# Patient Record
Sex: MALE | Race: WHITE | Employment: FULL TIME | URBAN - METROPOLITAN AREA
[De-identification: names, ages, dates, MRNs, and addresses within clinical notes are randomized per-mention and may not be internally consistent; named-entity substitution may affect disease eponyms.]

---

## 2020-12-17 ENCOUNTER — TRANSCRIBE ORDER (OUTPATIENT)
Dept: SCHEDULING | Age: 50
End: 2020-12-17

## 2020-12-17 ENCOUNTER — HOSPITAL ENCOUNTER (OUTPATIENT)
Dept: VASCULAR SURGERY | Age: 50
Discharge: HOME | End: 2020-12-17
Attending: ORTHOPAEDIC SURGERY
Payer: OTHER GOVERNMENT

## 2020-12-17 ENCOUNTER — HOSPITAL ENCOUNTER (EMERGENCY)
Age: 50
Discharge: HOME | End: 2020-12-17
Attending: EMERGENCY MEDICINE
Payer: OTHER GOVERNMENT

## 2020-12-17 VITALS
SYSTOLIC BLOOD PRESSURE: 141 MMHG | HEART RATE: 82 BPM | DIASTOLIC BLOOD PRESSURE: 92 MMHG | RESPIRATION RATE: 16 BRPM | OXYGEN SATURATION: 96 % | TEMPERATURE: 97.4 F

## 2020-12-17 DIAGNOSIS — I82.412 THROMBOSIS OF LEFT FEMORAL VEIN (HCC): ICD-10-CM

## 2020-12-17 DIAGNOSIS — I82.412 THROMBOSIS OF LEFT FEMORAL VEIN (HCC): Primary | ICD-10-CM

## 2020-12-17 DIAGNOSIS — I82.412 ACUTE DEEP VEIN THROMBOSIS (DVT) OF FEMORAL VEIN OF LEFT LOWER EXTREMITY (HCC): Primary | ICD-10-CM

## 2020-12-17 LAB
ALBUMIN SERPL-MCNC: 3.8 G/DL (ref 3.5–5)
ALBUMIN/GLOB SERPL: 1.1 {RATIO} (ref 1.1–2.2)
ALP SERPL-CCNC: 75 U/L (ref 45–117)
ALT SERPL-CCNC: 50 U/L (ref 12–78)
ANION GAP SERPL CALC-SCNC: 5 MMOL/L (ref 5–15)
AST SERPL-CCNC: 38 U/L (ref 15–37)
BILIRUB SERPL-MCNC: 0.5 MG/DL (ref 0.2–1)
BUN SERPL-MCNC: 15 MG/DL (ref 6–20)
BUN/CREAT SERPL: 12 (ref 12–20)
CALCIUM SERPL-MCNC: 9.2 MG/DL (ref 8.5–10.1)
CHLORIDE SERPL-SCNC: 104 MMOL/L (ref 97–108)
CO2 SERPL-SCNC: 28 MMOL/L (ref 21–32)
CREAT SERPL-MCNC: 1.22 MG/DL (ref 0.7–1.3)
GLOBULIN SER CALC-MCNC: 3.5 G/DL (ref 2–4)
GLUCOSE SERPL-MCNC: 100 MG/DL (ref 65–100)
POTASSIUM SERPL-SCNC: 4 MMOL/L (ref 3.5–5.1)
PROT SERPL-MCNC: 7.3 G/DL (ref 6.4–8.2)
SODIUM SERPL-SCNC: 137 MMOL/L (ref 136–145)

## 2020-12-17 PROCEDURE — 80053 COMPREHEN METABOLIC PANEL: CPT

## 2020-12-17 PROCEDURE — 36415 COLL VENOUS BLD VENIPUNCTURE: CPT

## 2020-12-17 PROCEDURE — 99282 EMERGENCY DEPT VISIT SF MDM: CPT

## 2020-12-17 PROCEDURE — 93971 EXTREMITY STUDY: CPT

## 2020-12-17 RX ORDER — APIXABAN 5 MG (74)
KIT ORAL
Qty: 1 DOSE PACK | Refills: 0 | Status: SHIPPED | OUTPATIENT
Start: 2020-12-17

## 2020-12-17 NOTE — ED PROVIDER NOTES
Patient is a 44year old male who presents to ED c/o left leg swelling and pain first appreciated in October. Patient reports he initially attributed the pain to a knee injury and made an appt with Dr. Maggie Lerner in order to have it evaluated. After being seen by Dr. Maggie Lerner today, he was sent for a duplex ultrasound to rule out DVT. Duplex revealed a left femoral vein thrombosis. Patient denies any chest pain, shortness of breath, difficulty breathing, fever, chills or any other symptoms at this time. Patient denies any prior history of DVT. Past Medical History: Depression             Past Medical History:   Diagnosis Date    DVT (deep venous thrombosis) ([facility] [address]        History reviewed. No pertinent surgical history. History reviewed. No pertinent family history.     Social History     Socioeconomic History    Marital status:      Spouse name: Not on file    Number of children: Not on file    Years of education: Not on file    Highest education level: Not on file   Occupational History    Not on file   Social Needs    Financial resource strain: Not on file    Food insecurity     Worry: Not on file     Inability: Not on file    Transportation needs     Medical: Not on file     Non-medical: Not on file   Tobacco Use    Smoking status: Not on file   Substance and Sexual Activity    Alcohol use: Not on file    Drug use: Not on file    Sexual activity: Not on file   Lifestyle    Physical activity     Days per week: Not on file     Minutes per session: Not on file    Stress: Not on file   Relationships    Social connections     Talks on phone: Not on file     Gets together: Not on file     Attends Jainism service: Not on file     Active member of club or organization: Not on file     Attends meetings of clubs or organizations: Not on file     Relationship status: Not on file    Intimate partner violence     Fear of current or ex partner: Not on file     Emotionally abused: Not on file Physically abused: Not on file     Forced sexual activity: Not on file   Other Topics Concern    Not on file   Social History Narrative    Not on file         ALLERGIES: Patient has no known allergies. Review of Systems   Constitutional: Negative for chills and fever. Respiratory: Negative for cough, chest tightness, shortness of breath and wheezing. Cardiovascular: Negative for chest pain and palpitations. Musculoskeletal: Positive for arthralgias and myalgias. Negative for back pain, joint swelling, neck pain and neck stiffness. Positive for left leg pain and swelling   Skin: Negative for color change, rash and wound. Allergic/Immunologic: Negative for immunocompromised state. Neurological: Negative for weakness, light-headedness, numbness and headaches. Hematological: Does not bruise/bleed easily. All other systems reviewed and are negative. Vitals:    12/17/20 1517   BP: (!) 141/92   Pulse: 82   Resp: 16   Temp: 97.4 °F (36.3 °C)   SpO2: 96%            Physical Exam  Vitals signs and nursing note reviewed. Constitutional:       Appearance: Normal appearance. He is well-developed. HENT:      Head: Normocephalic and atraumatic. Nose: Nose normal.      Mouth/Throat:      Mouth: Mucous membranes are moist.   Eyes:      General: Lids are normal.      Extraocular Movements: Extraocular movements intact. Conjunctiva/sclera: Conjunctivae normal.   Neck:      Musculoskeletal: Normal range of motion and neck supple. Cardiovascular:      Rate and Rhythm: Normal rate and regular rhythm. Pulses: Normal pulses. Heart sounds: Normal heart sounds, S1 normal and S2 normal.      Comments: Swelling noted to entire left lower extremity. Pulmonary:      Effort: Pulmonary effort is normal. No accessory muscle usage. Abdominal:      General: Abdomen is flat. Palpations: Abdomen is soft. Musculoskeletal: Normal range of motion.       Comments: Swelling to entire left leg noted with slight tenderness throughout. Neurovascularly intact distally. Ambulates without difficulty. Skin:     General: Skin is warm and dry. Capillary Refill: Capillary refill takes less than 2 seconds. Neurological:      General: No focal deficit present. Mental Status: He is alert and oriented to person, place, and time. Mental status is at baseline. Psychiatric:         Attention and Perception: Attention normal.         Mood and Affect: Mood and affect normal.         Speech: Speech normal.         Behavior: Behavior normal. Behavior is cooperative. Thought Content: Thought content normal.         Cognition and Memory: Cognition normal.         Judgment: Judgment normal.          MDM  Number of Diagnoses or Management Options  Acute deep vein thrombosis (DVT) of femoral vein of left lower extremity (HCC)  Diagnosis management comments: Patient is a 68-year-old male with history described above. Duplex ultrasound performed earlier reveals a left femoral vein thrombosis. Will order metabolic panel to evaluate kidney function and will start patient on a 30-day starter pack of Eliquis. Patient will plan to follow-up with primary care physician for evaluation and refill of Eliquis prescription. Return to ER precautions given to patient. Patient understands and agrees with plan. All questions addressed and answered to patient satisfaction. Amount and/or Complexity of Data Reviewed  Clinical lab tests: ordered and reviewed  Discuss the patient with other providers: yes (Dr. Niesha Juan, ED Attending )      ED Course as of Dec 17 1728   Thu Dec 17, 5987   6075 METABOLIC PANEL, COMPREHENSIVE(!):    Sodium 137   Potassium 4.0   Chloride 104   CO2 28   Anion gap 5   Glucose 100   BUN 15   Creatinine 1.22   BUN/Creatinine ratio 12   GFR est AA >60   GFR est non-AA >60   Calcium 9.2   Bilirubin, total 0.5   ALT 50   AST 38(!)   Alk.  phosphatase 75   Protein, total 7.3   Albumin 3.8   Globulin 3.5   A-G Ratio 1.1 [KM]      ED Course User Index  [KM] AMIE Delaney       Procedures

## 2020-12-17 NOTE — DISCHARGE INSTRUCTIONS
Patient Education     Patient was given information on Eliquis and given a prescription for a 30 day starter pack. Advised patient to follow-up with PCP in 1 month for re-eval and for refill of Eliquis prescription. Apixaban (By mouth)   Apixaban (a-PIX-a-ban)  Treats and prevents blood clots. This medicine is a blood thinner. Brand Name(s): Eliquis   There may be other brand names for this medicine. When This Medicine Should Not Be Used: This medicine is not right for everyone. Do not use it if you had an allergic reaction to apixaban or you have active bleeding. How to Use This Medicine:   Tablet  · Your doctor will tell you how much medicine to use. Do not use more than directed. · If you are not able to swallow the tablets whole, they may be crushed and mixed in water, 5% dextrose in water (D5W), apple juice, or applesauce. The crushed tablets may be mixed with 60 mL of water or D5W dose and given through a nasogastric tube (NGT). · This medicine should come with a Medication Guide. Ask your pharmacist for a copy if you do not have one. · Missed dose: Take a dose as soon as you remember. If it is almost time for your next dose, wait until then and take a regular dose. Do not take extra medicine to make up for a missed dose. · Store the medicine in a closed container at room temperature, away from heat, moisture, and direct light. Drugs and Foods to Avoid:   Ask your doctor or pharmacist before using any other medicine, including over-the-counter medicines, vitamins, and herbal products. · Some medicines can affect how apixaban works.  Tell your doctor if you are using any of the following:   ¨ Carbamazepine, clarithromycin, itraconazole, ketoconazole, phenytoin, rifampin, ritonavir, Ayana's wort  ¨ Blood thinner (including clopidogrel, heparin, prasugrel, warfarin)  ¨ Medicine to treat depression  ¨ NSAID pain or arthritis medicine (including aspirin, celecoxib, diclofenac, ibuprofen, naproxen)  Warnings While Using This Medicine:   · Tell your doctor if you are pregnant or breastfeeding, or if you have kidney disease, liver disease, bleeding problems, or an artificial heart valve. · Do not stop using this medicine suddenly without asking your doctor. You might have a higher risk of stroke for a short time after you stop using this medicine. · This medicine increases your risk for bleeding that can become serious if not controlled. You may also bruise easily, and it may take longer than usual for bleeding to stop. · This medicine may increase your risk for blood clots in your spine or back if you undergo an epidural or spinal puncture. This could lead to paralysis. Tell your doctor if you ever had spine problems or back surgery. · Tell any doctor or dentist who treats you that you are using this medicine. With your doctor's supervision, you may need to stop using this medicine several days before you have surgery or medical tests. · Your doctor will do lab tests at regular visits to check on the effects of this medicine. Keep all appointments. · Keep all medicine out of the reach of children. Never share your medicine with anyone. Possible Side Effects While Using This Medicine:   Call your doctor right away if you notice any of these side effects:  · Allergic reaction: Itching or hives, swelling in your face or hands, swelling or tingling in your mouth or throat, chest tightness, trouble breathing  · Change in how much or how often you urinate, red or pink urine  · Chest pain, trouble breathing  · Coughing up blood, vomiting blood or material that looks like coffee grounds  · Numbness, tingling, or muscle weakness in your legs or feet  · Red or black, tarry stools  · Unusual bleeding, bruising, or weakness  If you notice other side effects that you think are caused by this medicine, tell your doctor. Call your doctor for medical advice about side effects.  You may report side effects to FDA at 1-800-FDA-1088  © 2017 [facility] Information is for End User's use only and may not be sold, redistributed or otherwise used for commercial purposes. The above information is an  only. It is not intended as medical advice for individual conditions or treatments. Talk to your doctor, nurse or pharmacist before following any medical regimen to see if it is safe and effective for you. Deep Vein Thrombosis: Care Instructions  Overview     A deep vein thrombosis (DVT) is a blood clot in certain veins, usually in the legs, pelvis, or arms. Blood clots in these veins need to be treated because they can get bigger, break loose, and travel through the bloodstream to the lungs. A blood clot in a lung can be life-threatening. The doctor may have given you a blood thinner (anticoagulant). A blood thinner can stop the blood clot from growing larger and prevent new clots from forming. You will need to take a blood thinner for 3 to 6 months or longer. The doctor has checked you carefully, but problems can develop later. If you notice any problems or new symptoms, get medical treatment right away. Follow-up care is a key part of your treatment and safety. Be sure to make and go to all appointments, and call your doctor if you are having problems. It's also a good idea to know your test results and keep a list of the medicines you take. How can you care for yourself at home? · Take your medicines exactly as prescribed. Call your doctor if you think you are having a problem with your medicine. · If you are taking a blood thinner, be sure you get instructions about how to take your medicine safely. Blood thinners can cause serious bleeding problems. · Wear compression stockings if your doctor recommends them. These stockings are tighter at the feet than on the legs. They may reduce pain and swelling in your legs.  But there are different types of stockings, and they need to fit right. So your doctor will recommend what you need. · When you sit, use a pillow to raise the arm or leg that has the blood clot. Try to keep it above the level of your heart. When should you call for help? Call 911 anytime you think you may need emergency care. For example, call if:    · You passed out (lost consciousness).     · You have symptoms of a blood clot in your lung (called a pulmonary embolism). These include:  ? Sudden chest pain. ? Trouble breathing. ? Coughing up blood. Call your doctor now or seek immediate medical care if:    · You have new or worse trouble breathing.     · You are dizzy or lightheaded, or you feel like you may faint.     · You have symptoms of a blood clot in your arm or leg. These may include:  ? Pain in the arm, calf, back of the knee, thigh, or groin. ? Redness and swelling in the arm, leg, or groin. Watch closely for changes in your health, and be sure to contact your doctor if:    · You do not get better as expected. Where can you learn more? Go to http://www.gray.com/  Enter W445 in the search box to learn more about \"Deep Vein Thrombosis: Care Instructions. \"  Current as of: March 4, 2020               Content Version: 12.6  © 3728-8152 [employer]. Care instructions adapted under license by [employer] (which disclaims liability or warranty for this information). If you have questions about a medical condition or this instruction, always ask your healthcare professional. [place] [address] any warranty or liability for your use of this information.

## 2020-12-17 NOTE — ED TRIAGE NOTES
Pt sent here for thrombosis of left femoral vein, denies cp or sob, pt c/o swelling and pain since beginning of Oct , had a doppler this am

## 2020-12-17 NOTE — ED NOTES
Patient left prior to receiving discharge instructions or having IV removed by staff. Dr. Marsha Daugherty.

## 2020-12-17 NOTE — ED NOTES
Patient called by Dr. Vinny Browne. Patient reports he received discharge instructions from PA and IV was removed.

## 2021-01-13 ENCOUNTER — TRANSCRIBE ORDER (OUTPATIENT)
Dept: SCHEDULING | Age: 51
End: 2021-01-13

## 2021-01-13 DIAGNOSIS — I82.412 DVT FEMORAL (DEEP VENOUS THROMBOSIS) WITH THROMBOPHLEBITIS, LEFT (HCC): Primary | ICD-10-CM

## 2021-05-24 ENCOUNTER — TRANSCRIBE ORDER (OUTPATIENT)
Dept: SCHEDULING | Age: 51
End: 2021-05-24

## 2021-05-24 DIAGNOSIS — I82.432 EMBOLISM AND THROMBOSIS OF LEFT POPLITEAL VEIN (HCC): Primary | ICD-10-CM

## 2021-05-27 ENCOUNTER — HOSPITAL ENCOUNTER (OUTPATIENT)
Dept: NON INVASIVE DIAGNOSTICS | Age: 51
Discharge: HOME | End: 2021-05-27
Attending: INTERNAL MEDICINE
Payer: OTHER GOVERNMENT

## 2021-05-27 DIAGNOSIS — I82.432 EMBOLISM AND THROMBOSIS OF LEFT POPLITEAL VEIN (HCC): ICD-10-CM

## 2021-05-27 PROCEDURE — 93971 EXTREMITY STUDY: CPT

## 2021-06-04 ENCOUNTER — TRANSCRIBE ORDER (OUTPATIENT)
Dept: SCHEDULING | Age: 51
End: 2021-06-04

## 2021-06-04 DIAGNOSIS — I82.432 EMBOLISM AND THROMBOSIS OF LEFT POPLITEAL VEIN (HCC): Primary | ICD-10-CM

## 2021-12-03 ENCOUNTER — OFFICE VISIT (OUTPATIENT)
Dept: ORTHOPEDIC SURGERY | Age: 51
End: 2021-12-03
Payer: OTHER GOVERNMENT

## 2021-12-03 VITALS — WEIGHT: 250 LBS | BODY MASS INDEX: 29.52 KG/M2 | HEIGHT: 77 IN

## 2021-12-03 DIAGNOSIS — S83.212S: ICD-10-CM

## 2021-12-03 DIAGNOSIS — M25.562 ACUTE PAIN OF LEFT KNEE: Primary | ICD-10-CM

## 2021-12-03 PROCEDURE — 99214 OFFICE O/P EST MOD 30 MIN: CPT | Performed by: ORTHOPAEDIC SURGERY

## 2021-12-03 RX ORDER — TRAZODONE HYDROCHLORIDE 100 MG/1
100 TABLET ORAL AS NEEDED
COMMUNITY

## 2021-12-03 RX ORDER — APIXABAN 5 MG/1
TABLET, FILM COATED ORAL
COMMUNITY
Start: 2021-11-14

## 2021-12-03 RX ORDER — SERTRALINE HYDROCHLORIDE 100 MG/1
100 TABLET, FILM COATED ORAL DAILY
COMMUNITY

## 2021-12-03 RX ORDER — OMEPRAZOLE 20 MG/1
CAPSULE, DELAYED RELEASE ORAL
COMMUNITY
Start: 2021-09-20

## 2021-12-03 NOTE — PROGRESS NOTES
Tushar Fields (: 1970) is a 46 y.o. male, patient, here for evaluation of the following chief complaint(s):  Knee Pain (left)       HPI:    Patient's chief complaint is left knee pain. A year ago he had a torn meniscus. When I initially saw him I also diagnosed him with a DVT and has been treated with Eliquis for a year. Knee still catches and locks. Still swells. He has finally been cleared for surgery and would like to go ahead and proceed with arthroscopy. No Known Allergies    Current Outpatient Medications   Medication Sig    Eliquis 5 mg tablet     omeprazole (PRILOSEC) 20 mg capsule     sertraline (ZOLOFT) 100 mg tablet Take 100 mg by mouth daily.  traZODone (DESYREL) 100 mg tablet Take 100 mg by mouth as needed for Sleep. No current facility-administered medications for this visit. History reviewed. No pertinent past medical history. History reviewed. No pertinent surgical history. History reviewed. No pertinent family history. Social History     Socioeconomic History    Marital status:      Spouse name: Not on file    Number of children: Not on file    Years of education: Not on file    Highest education level: Not on file   Occupational History    Not on file   Tobacco Use    Smoking status: Never Smoker    Smokeless tobacco: Never Used   Substance and Sexual Activity    Alcohol use: Not on file    Drug use: Not on file    Sexual activity: Not on file   Other Topics Concern    Not on file   Social History Narrative    Not on file     Social Determinants of Health     Financial Resource Strain:     Difficulty of Paying Living Expenses: Not on file   Food Insecurity:     Worried About Running Out of Food in the Last Year: Not on file    Ivy of Food in the Last Year: Not on file   Transportation Needs:     Lack of Transportation (Medical): Not on file    Lack of Transportation (Non-Medical):  Not on file   Physical Activity:     Days of Exercise per Week: Not on file    Minutes of Exercise per Session: Not on file   Stress:     Feeling of Stress : Not on file   Social Connections:     Frequency of Communication with Friends and Family: Not on file    Frequency of Social Gatherings with Friends and Family: Not on file    Attends Protestant Services: Not on file    Active Member of [address] [place] [address] Accountable or Organizations: Not on file    Attends Club or Organization Meetings: Not on file    Marital Status: Not on file   Intimate Partner Violence:     Fear of Current or Ex-Partner: Not on file    Emotionally Abused: Not on file    Physically Abused: Not on file    Sexually Abused: Not on file   Housing Stability:     Unable to Pay for Housing in the Last Year: Not on file    Number of [place] in the Last Year: Not on file    Unstable Housing in the Last Year: Not on file             Vitals:  Ht 6' 5\" (1.956 m)   Wt 250 lb (113.4 kg)   BMI 29.65 kg/m²    Body mass index is 29.65 kg/m². PHYSICAL EXAM:  Left lower extremity exam: Knee exam today shows a painless hip range of motion. Exam shows moderate-sized effusion. There is a positive medial Nannette's test.  Pain is present along medial joint line. No instability is noted. Range of motion is 3 degrees to 120 degrees. Patella tracks centrally in the trochlear groove with negative J sign. Extremity is sensate and perfused. Straight leg raise and femoral nerve stretch test are negative. Motor testing reveals intact quads, ankle plantar flexors, ankle dorsiflexors. IMAGING:  XR Results (most recent):  Results from Appointment encounter on 12/03/21    XR KNEE LT 3 V    Narrative  3 views left knee. Standing AP, lateral, merchant. Very slight narrowing of the lateral joint space. No acute issues are noted. ASSESSMENT/PLAN:  1. Acute pain of left knee  -     XR KNEE LT 3 V; Future  2.  Bucket handle tear of medial meniscus of left knee, sequela    Patient has persistent symptoms with his left knee medial meniscus tear and would like to proceed with surgery. We discussed continued conservative treatment measures versus total joint replacement. Symptoms have progressed despite conservative treatment measures outlined above and they desire to proceed with left knee arthroscopy with partial medial meniscectomy. .    Patient has had symptoms for over 12months. They have decline in their activities of daily living with inability to walk long distances. There has been progressive decline in function. Discussed risks, benefits, and alternatives in detail, as well as anticipated hospital stay and course of rehabilitation. Patient has been cleared for surgery by his primary care and will hold his Eliquis. All questions answered. An electronic signature was used to authenticate this note.   --Za Kelley MD

## 2021-12-08 DIAGNOSIS — S83.282A ACUTE LATERAL MENISCUS TEAR OF LEFT KNEE, INITIAL ENCOUNTER: Primary | ICD-10-CM

## 2021-12-20 DIAGNOSIS — Z98.890 S/P LEFT KNEE ARTHROSCOPY: Primary | ICD-10-CM

## 2021-12-20 RX ORDER — HYDROCODONE BITARTRATE AND ACETAMINOPHEN 5; 325 MG/1; MG/1
1 TABLET ORAL
Qty: 40 TABLET | Refills: 0 | Status: SHIPPED | OUTPATIENT
Start: 2021-12-20 | End: 2021-12-21 | Stop reason: SDUPTHER

## 2021-12-21 DIAGNOSIS — Z98.890 S/P LEFT KNEE ARTHROSCOPY: ICD-10-CM

## 2021-12-21 RX ORDER — HYDROCODONE BITARTRATE AND ACETAMINOPHEN 5; 325 MG/1; MG/1
1 TABLET ORAL
Qty: 40 TABLET | Refills: 0 | Status: SHIPPED | OUTPATIENT
Start: 2021-12-21 | End: 2022-01-04

## 2022-01-07 ENCOUNTER — OFFICE VISIT (OUTPATIENT)
Dept: ORTHOPEDIC SURGERY | Age: 52
End: 2022-01-07
Payer: OTHER GOVERNMENT

## 2022-01-07 VITALS — WEIGHT: 250 LBS | HEIGHT: 77 IN | BODY MASS INDEX: 29.52 KG/M2

## 2022-01-07 DIAGNOSIS — Z98.890 STATUS POST ARTHROSCOPIC PARTIAL MEDIAL MENISCECTOMY: Primary | ICD-10-CM

## 2022-01-07 PROCEDURE — 99024 POSTOP FOLLOW-UP VISIT: CPT | Performed by: PHYSICIAN ASSISTANT

## 2022-01-07 NOTE — PROGRESS NOTES
Nurys Cruz (: 1970) is a 46 y.o. male, established patient, here for evaluation of the following chief complaint(s):  No chief complaint on file. SUBJECTIVE/OBJECTIVE:  Nurys Cruz (: 1970) is a 46 y.o. male. Left knee arthroscopic meniscectomy  The patient is doing well at this time and is pleased with the results of his surgery and the progress which he has made to date. No Known Allergies    Current Outpatient Medications   Medication Sig    Eliquis 5 mg tablet     omeprazole (PRILOSEC) 20 mg capsule     sertraline (ZOLOFT) 100 mg tablet Take 100 mg by mouth daily.  traZODone (DESYREL) 100 mg tablet Take 100 mg by mouth as needed for Sleep. No current facility-administered medications for this visit. Social History     Socioeconomic History    Marital status:      Spouse name: Not on file    Number of children: Not on file    Years of education: Not on file    Highest education level: Not on file   Occupational History    Not on file   Tobacco Use    Smoking status: Never Smoker    Smokeless tobacco: Never Used   Substance and Sexual Activity    Alcohol use: Not on file    Drug use: Not on file    Sexual activity: Not on file   Other Topics Concern    Not on file   Social History Narrative    Not on file     Social Determinants of Health     Financial Resource Strain:     Difficulty of Paying Living Expenses: Not on file   Food Insecurity:     Worried About Running Out of Food in the Last Year: Not on file    Ivy of Food in the Last Year: Not on file   Transportation Needs:     Lack of Transportation (Medical): Not on file    Lack of Transportation (Non-Medical):  Not on file   Physical Activity:     Days of Exercise per Week: Not on file    Minutes of Exercise per Session: Not on file   Stress:     Feeling of Stress : Not on file   Social Connections:     Frequency of Communication with Friends and Family: Not on file    Frequency of Social Gatherings with Friends and Family: Not on file    Attends Moravian Services: Not on file    Active Member of Clubs or Organizations: Not on file    Attends Club or Organization Meetings: Not on file    Marital Status: Not on file   Intimate Partner Violence:     Fear of Current or Ex-Partner: Not on file    Emotionally Abused: Not on file    Physically Abused: Not on file    Sexually Abused: Not on file   Housing Stability:     Unable to Pay for Housing in the Last Year: Not on file    Number of [place] in the Last Year: Not on file    Unstable Housing in the Last Year: Not on file       History reviewed. No pertinent surgical history. History reviewed. No pertinent family history. REVIEW OF SYSTEMS:  ROS    Patient denies any recent fever, chills, nausea, vomiting, chest pain, or shortness of breath. Vitals:  Ht 6' 5\" (1.956 m)   Wt 250 lb (113.4 kg)   BMI 29.65 kg/m²    Body mass index is 29.65 kg/m². PHYSICAL EXAM:  Physical Exam     Postoperative wound is well healed without erythema or drainage. ROM of the operative extremity demonstrates full extension with flexion to 110 degrees. Mild swelling of the operative knee is noted. There is no calf tenderness. Distal motor and sensation is intact. IMAGING:    Results from Appointment encounter on 12/03/21    XR KNEE LT 3 V    Narrative  3 views left knee. Standing AP, lateral, merchant. Very slight narrowing of the lateral joint space. No acute issues are noted. No orders of the defined types were placed in this encounter. ASSESSMENT/PLAN:      Status post arthroscopic meniscectomy    Below is the assessment and plan developed based on review of pertinent history, physical exam, labs, studies, and medications. Discussed radiographic findings and have answered all patient questions to his satisfaction. Have provided the patient with a prescription for outpatient PT.  Have asked the patient to follow up in 4 weeks time for reevaluation with Dr Anton Allen. The patient was asked to contact the office with any questions or concerns. The patient understands and agrees to the treatment plan as outlined above. Return in about 4 weeks (around 2/4/2022) for post op follow up with Trevor. Dr. Anton Allen was available for immediate consultation as needed. An electronic signature was used to authenticate this note.   -- Ayleen Villalobos PA-C